# Patient Record
Sex: MALE | Race: WHITE
[De-identification: names, ages, dates, MRNs, and addresses within clinical notes are randomized per-mention and may not be internally consistent; named-entity substitution may affect disease eponyms.]

---

## 2017-12-01 ENCOUNTER — HOSPITAL ENCOUNTER (EMERGENCY)
Dept: HOSPITAL 10 - E/R | Age: 57
Discharge: LEFT BEFORE DISCHARGE | End: 2017-12-01
Payer: COMMERCIAL

## 2017-12-01 VITALS
RESPIRATION RATE: 17 BRPM | SYSTOLIC BLOOD PRESSURE: 114 MMHG | HEART RATE: 100 BPM | DIASTOLIC BLOOD PRESSURE: 82 MMHG | TEMPERATURE: 98.6 F

## 2017-12-01 VITALS
WEIGHT: 190.04 LBS | WEIGHT: 190.04 LBS | HEIGHT: 70 IN | HEIGHT: 70 IN | BODY MASS INDEX: 27.21 KG/M2 | BODY MASS INDEX: 27.21 KG/M2

## 2017-12-01 DIAGNOSIS — E11.9: ICD-10-CM

## 2017-12-01 DIAGNOSIS — I10: ICD-10-CM

## 2017-12-01 DIAGNOSIS — R10.32: ICD-10-CM

## 2017-12-01 DIAGNOSIS — R06.00: ICD-10-CM

## 2017-12-01 DIAGNOSIS — Z95.1: ICD-10-CM

## 2017-12-01 DIAGNOSIS — I25.10: ICD-10-CM

## 2017-12-01 DIAGNOSIS — R07.9: Primary | ICD-10-CM

## 2017-12-01 LAB
ANION GAP SERPL CALC-SCNC: 13 MMOL/L (ref 8–16)
BASOPHILS # BLD AUTO: 0 10^3/UL (ref 0–0.1)
BASOPHILS NFR BLD: 0.4 % (ref 0–2)
BNP SERPL-MCNC: 1220 PG/ML (ref 0–125)
BUN SERPL-MCNC: 12 MG/DL (ref 7–20)
CALCIUM SERPL-MCNC: 9.3 MG/DL (ref 8.4–10.2)
CHLORIDE SERPL-SCNC: 104 MMOL/L (ref 97–110)
CO2 SERPL-SCNC: 28 MMOL/L (ref 21–31)
CREAT SERPL-MCNC: 0.82 MG/DL (ref 0.61–1.24)
EOSINOPHIL # BLD: 0.3 10^3/UL (ref 0–0.5)
EOSINOPHIL NFR BLD: 2.5 % (ref 0–7)
ERYTHROCYTE [DISTWIDTH] IN BLOOD BY AUTOMATED COUNT: 13 % (ref 11.5–14.5)
GLUCOSE SERPL-MCNC: 92 MG/DL (ref 70–220)
HCT VFR BLD CALC: 29.8 % (ref 42–52)
HGB BLD-MCNC: 9.8 G/DL (ref 14–18)
INR PPP: 1.09
LYMPHOCYTES # BLD AUTO: 2.3 10^3/UL (ref 0.8–2.9)
LYMPHOCYTES NFR BLD AUTO: 22.7 % (ref 15–51)
MCH RBC QN AUTO: 31.1 PG (ref 29–33)
MCHC RBC AUTO-ENTMCNC: 32.9 G/DL (ref 32–37)
MCV RBC AUTO: 94.6 FL (ref 82–101)
MONOCYTES # BLD: 0.7 10^3/UL (ref 0.3–0.9)
MONOCYTES NFR BLD: 7.2 % (ref 0–11)
NEUTROPHILS # BLD: 6.7 10^3/UL (ref 1.6–7.5)
NEUTROPHILS NFR BLD AUTO: 66.5 % (ref 39–77)
NRBC # BLD MANUAL: 0 10^3/UL (ref 0–0)
NRBC BLD AUTO-RTO: 0 /100WBC (ref 0–0)
PLATELET # BLD: 543 10^3/UL (ref 140–415)
PMV BLD AUTO: 9.2 FL (ref 7.4–10.4)
POTASSIUM SERPL-SCNC: 4.2 MMOL/L (ref 3.5–5.1)
PROTHROMBIN TIME: 14.2 SEC (ref 11.9–14.9)
PT RATIO: 1.1
RBC # BLD AUTO: 3.15 10^6/UL (ref 4.7–6.1)
SODIUM SERPL-SCNC: 141 MMOL/L (ref 135–144)
TROPONIN I SERPL-MCNC: 0.08 NG/ML (ref 0–0.12)
WBC # BLD AUTO: 10.1 10^3/UL (ref 4.8–10.8)

## 2017-12-01 PROCEDURE — 36415 COLL VENOUS BLD VENIPUNCTURE: CPT

## 2017-12-01 PROCEDURE — 71010: CPT

## 2017-12-01 PROCEDURE — 84484 ASSAY OF TROPONIN QUANT: CPT

## 2017-12-01 PROCEDURE — 93971 EXTREMITY STUDY: CPT

## 2017-12-01 PROCEDURE — 83880 ASSAY OF NATRIURETIC PEPTIDE: CPT

## 2017-12-01 PROCEDURE — 93005 ELECTROCARDIOGRAM TRACING: CPT

## 2017-12-01 PROCEDURE — 85610 PROTHROMBIN TIME: CPT

## 2017-12-01 PROCEDURE — 80048 BASIC METABOLIC PNL TOTAL CA: CPT

## 2017-12-01 PROCEDURE — 85025 COMPLETE CBC W/AUTO DIFF WBC: CPT

## 2017-12-01 NOTE — RADRPT
PROCEDURE:   Chest x-ray 

 

CLINICAL INDICATION:   Chest pain

 

TECHNIQUE:   Chest single view

 

COMPARISON:   None 

 

FINDINGS:

There post sternotomy changes. Moderate cardiomegaly and mild atherosclerotic aortic calcification s
een.  The pulmonary vessels are normal in caliber.  The lungs are clear.  The costophrenic angles ar
e sharp.  The visualized bony thorax is unremarkable. 

 

IMPRESSION:

 

Cardiomegaly and mild atherosclerotic aortic calcification

Status post sternotomy

 

 

RPTAT: HH

_____________________________________________ 

.Fabrizio Chaparro MD, MD           Date    Time 

Electronically viewed and signed by .Fabrizio Chaparro MD, MD on 12/01/2017 16:44 

 

D:  12/01/2017 16:44  T:  12/01/2017 16:44

.W/

## 2017-12-01 NOTE — RADRPT
PROCEDURE:  US venous left lower extremity 

 

CLINICAL INDICATION:  Left lower extremity swelling. 

 

TECHNIQUE:  Multiple longitudinal and transverse images of the left lower extremity veins were obtai
moisés with gray scale and color Doppler imaging. The calf veins were interrogated as well. 

 

COMPARISON:  None available. 

 

FINDINGS:  

Common femoral vein: Normal flow. Compressible and augmentable.

Proximal superficial femoral vein: Normal flow. Compressible and augmentable.

Mid superficial femoral vein: Normal flow. Compressible and augmentable.

Distal superficial femoral vein: Normal flow. Compressible and augmentable.

Popliteal vein: Normal flow. Compressible and augmentable.

Calf veins: Normal flow.

 

IMPRESSION:

 

1.   No evidence of a deep vein thrombosis within the left lower extremity.  

 

RPTAT: HLBP

_____________________________________________ 

.Marko Chen MD, MD           Date    Time 

Electronically viewed and signed by .Marko Chen MD, MD on 12/01/2017 17:23 

 

D:  12/01/2017 17:23  T:  12/01/2017 17:23

.P/

## 2017-12-01 NOTE — ERD
ER Documentation


Chief Complaint


Chief Complaint


Pt presents with CP x 2 days and B leg pain, Pt had CAGB-4V 11/17/17





HPI


Patient is a 57-year-old male who presents with sudden onset intermittent 

supraclavicular pain when he tries to sit up for the last 2 weeks.  This began 

after he had surgery at [facility] for a four-vessel CABG.  He also 

had vein harvest from right leg and coronary cath access in the left groin.  He 

complains of pain in the left groin is worse with walking.  He denies fever.  

He does report constant mild shortness of breath that is worse with ambulation.

  He has not yet had follow-up with his cardiothoracic surgeon.





ROS


All systems reviewed and are negative except as per history of present illness.





Allergies


Allergies:  


Coded Allergies:  


     No Known Allergy (Unverified , 12/1/17)





PMhx/Soc


Past medical history: Coronary artery disease, diabetes mellitus, hypertension


Past surgical history: CABG, 2 hernia repairs


Social history: Remote history of cocaine and cannabis use, no current drug, 

alcohol or tobacco use.


History of Surgery:  Yes (CABGx4, right shoulder, bilateral inguinal hernia)


Anesthesia Reaction:  No


Hx Neurological Disorder:  No


Hx Respiratory Disorders:  No


Hx Cardiac Disorders:  Yes (MI)


Hx Psychiatric Problems:  No


Hx Miscellaneous Medical Probl:  No


Hx Alcohol Use:  No


Hx Substance Use:  No


Hx Tobacco Use:  No


Smoking Status:  Never smoker





FmHx


Noncontributory





Physical Exam


Vitals





Vital Signs








  Date Time  Temp Pulse Resp B/P Pulse Ox O2 Delivery O2 Flow Rate FiO2


 


12/1/17 17:31 98.6 100 17 114/82 97 Room Air  


 


12/1/17 16:07 98.6 105 17 123/78 97 Room Air  


 


12/1/17 15:02 98.4 107 18 144/69 96   








Physical Exam


Const:     Alert, no acute distress


Head:   Atraumatic 


Eyes:    Normal Conjunctiva, No pallor, no icterus


ENT:    Normal External Ears, Nose and Mouth.  Mucous membranes moist


Neck:               Full range of motion. No JVD


Resp:    Clear to auscultation bilaterally, No wheezes, no rales


Cardio:    Regular rate and rhythm, no murmurs, No gallop, no rub.  Midline 

sternotomy incision clean, dry and intact.


Abd:    Soft, non tender, non distended. 


Skin:    No petechiae or rashes


Ext:    No cyanosis, or edema, 2+ pulses in 4 extremities.  Tenderness in the 

left medial thigh, no pulsatile mass.


Neur:    Awake and alert, Cranial nerves II through XII intact bilaterally, 

strength and sensation full for extremity's.


Psych:    Normal Mood and Affect


Result Diagram:  


12/1/17 1700                                                                   

             12/1/17 1700





Results 24 hrs





 Laboratory Tests








Test


  12/1/17


17:00 12/1/17


18:05


 


White Blood Count 10.110^3/ul  


 


Red Blood Count 3.1510^6/ul  


 


Hemoglobin 9.8g/dl  


 


Hematocrit 29.8%  


 


Mean Corpuscular Volume 94.6fl  


 


Mean Corpuscular Hemoglobin 31.1pg  


 


Mean Corpuscular Hemoglobin


Concent 32.9g/dl 


  


 


 


Red Cell Distribution Width 13.0%  


 


Platelet Count 02434^3/UL  


 


Mean Platelet Volume 9.2fl  


 


Neutrophils % 66.5%  


 


Lymphocytes % 22.7%  


 


Monocytes % 7.2%  


 


Eosinophils % 2.5%  


 


Basophils % 0.4%  


 


Nucleated Red Blood Cells % 0.0/100WBC  


 


Neutrophils # 6.710^3/ul  


 


Lymphocytes # 2.310^3/ul  


 


Monocytes # 0.710^3/ul  


 


Eosinophils # 0.310^3/ul  


 


Basophils # 0.010^3/ul  


 


Nucleated Red Blood Cells # 0.010^3/ul  


 


Sodium Level 141mmol/L  


 


Potassium Level 4.2mmol/L  


 


Chloride Level 104mmol/L  


 


Carbon Dioxide Level 28mmol/L  


 


Anion Gap 13  


 


Blood Urea Nitrogen 12mg/dl  


 


Creatinine 0.82mg/dl  


 


Glucose Level 92mg/dl  


 


Calcium Level 9.3mg/dl  


 


Troponin I 0.078ng/ml  


 


B-Type Natriuretic Peptide 1220PG/ML  


 


Prothrombin Time  14.2Sec 


 


Prothrombin Time Ratio  1.1 


 


INR International Normalized


Ratio 


  1.09 


 








 Current Medications








 Medications


  (Trade)  Dose


 Ordered  Sig/Bria


 Route


 PRN Reason  Start Time


 Stop Time Status Last Admin


Dose Admin


 


 Aspirin


  (Aspirin)  162 mg  ONCE  STAT


 PO


   12/1/17 16:22


 12/1/17 16:25 DC 12/1/17 17:33


 











Procedures/MDM


EKG read by me: Time 1507, rate 113


Rhythm: Sinus tachycardia


Axis: Normal


Intervals: Normal


ST-T waves: Lateral T-wave inversion, inferior T-wave inversion, no ST 

elevation or depression


Ectopy: No


Q-waves: Inferior Q waves are not clearly pathologic


Impression:     Sinus tachycardia with T-wave inversions.





MDM: Patient is a 57-year-old male who had a four-vessel CABG approximately 2 

weeks ago and presents to the ER with complaint of dyspnea that is exacerbated 

by exertion, pain in his upper sternum, and pain in his left groin.  With 

regards to the pain in his chest, he states that it is with movement such as 

leaning forward or sitting up.  He denies that he has chest pain with exertion.

  It is focal in the region of the sternotomy, and is likely chest wall pain.  

However, the patient does report dyspnea at rest that is worse with exertion.  

He has an elevated BNP, and slightly elevated troponin.  I advised that he be 

admitted to the hospital for echocardiogram to exclude underlying CHF or 

pericardial effusion, or complication of recent CABG.  I also evaluated the 

patient's groin pain.  There is no clinical sign of a post-cath aneurysm, and 

ultrasound is negative for DVT.  I advised the patient that he may require an 

arterial ultrasound to evaluate further.  He had equal pulses in 4 extremities.

  There is no sign of infection.  The patient refused admission to the hospital 

and signed out AGAINST MEDICAL ADVICE.  I discussed with him risks including 

surgery related complications, heart attack, heart failure, arrhythmia, aneurysm

, infection or death.  The patient was able to express understanding of these 

risks and signed out AGAINST MEDICAL ADVICE.  I did speak to the patient's 

cardiothoracic surgeon, Dr. Beth at [facility].  He could not 

provide a specified follow-up appointments, but advised that the patient call 

his office on Monday to arrange a follow-up appointment.  The patient was 

advised of the need to do so.





Departure


Diagnosis:  


 Primary Impression:  


 Chest pain


 Chest pain type:  unspecified  Qualified Code:  R07.9 - Chest pain, 

unspecified type


 Additional Impressions:  


 Groin pain


 Laterality:  left  Qualified Code:  R10.32 - Left inguinal pain


 Dyspnea


 Dyspnea type:  unspecified  Qualified Code:  R06.00 - Dyspnea, unspecified type


Condition:  Stable (AGAINST MEDICAL ADVICE)











DAVIN ORELLANA MD Dec 1, 2017 16:31

## 2017-12-02 ENCOUNTER — HOSPITAL ENCOUNTER (INPATIENT)
Dept: HOSPITAL 10 - E/R | Age: 57
LOS: 3 days | Discharge: HOME | DRG: 314 | End: 2017-12-05
Payer: COMMERCIAL

## 2017-12-02 VITALS
WEIGHT: 193.35 LBS | WEIGHT: 193.35 LBS | BODY MASS INDEX: 27.07 KG/M2 | BODY MASS INDEX: 27.07 KG/M2 | HEIGHT: 71 IN | HEIGHT: 71 IN

## 2017-12-02 DIAGNOSIS — Z79.4: ICD-10-CM

## 2017-12-02 DIAGNOSIS — J90: ICD-10-CM

## 2017-12-02 DIAGNOSIS — Z95.1: ICD-10-CM

## 2017-12-02 DIAGNOSIS — Z79.82: ICD-10-CM

## 2017-12-02 DIAGNOSIS — I50.33: ICD-10-CM
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
## 2017-12-02 DIAGNOSIS — E11.9: ICD-10-CM

## 2017-12-02 DIAGNOSIS — I31.3: Primary | ICD-10-CM

## 2017-12-02 DIAGNOSIS — E78.5: ICD-10-CM

## 2017-12-02 DIAGNOSIS — I25.810: ICD-10-CM

## 2017-12-02 DIAGNOSIS — I11.0: ICD-10-CM

## 2017-12-02 DIAGNOSIS — I97.641: ICD-10-CM

## 2017-12-02 LAB
ALBUMIN SERPL-MCNC: 3.9 G/DL (ref 3.3–4.9)
ALBUMIN/GLOB SERPL: 1.21 {RATIO}
ALP SERPL-CCNC: 105 IU/L (ref 42–121)
ALT SERPL-CCNC: 38 IU/L (ref 13–69)
ANION GAP SERPL CALC-SCNC: 15 MMOL/L (ref 8–16)
ARTERIAL COHB: 0.3 % (ref 0–3)
ARTERIAL FRACTION OF OXYHGB: 94.1 % (ref 93–99)
ARTERIAL METHB: 0.1 % (ref 0–1.5)
ARTERIAL PATENCY WRIST A: (no result)
ARTERIAL TOTAL HEMGLOBIN: 11.2 G/DL (ref 12–18)
AST SERPL-CCNC: 25 IU/L (ref 15–46)
BASE EXCESS BLDA CALC-SCNC: 0.7 MMOL/L (ref -3–3)
BASOPHILS # BLD AUTO: 0.1 10^3/UL (ref 0–0.1)
BASOPHILS NFR BLD: 0.6 % (ref 0–2)
BILIRUB DIRECT SERPL-MCNC: 0 MG/DL (ref 0–0.2)
BILIRUB SERPL-MCNC: 0.3 MG/DL (ref 0.2–1.3)
BNP SERPL-MCNC: 1170 PG/ML (ref 0–125)
BUN SERPL-MCNC: 15 MG/DL (ref 7–20)
CALCIUM SERPL-MCNC: 9.1 MG/DL (ref 8.4–10.2)
CHLORIDE SERPL-SCNC: 102 MMOL/L (ref 97–110)
CO2 SERPL-SCNC: 28 MMOL/L (ref 21–31)
CREAT SERPL-MCNC: 0.85 MG/DL (ref 0.61–1.24)
EOSINOPHIL # BLD: 0.4 10^3/UL (ref 0–0.5)
EOSINOPHIL NFR BLD: 2.9 % (ref 0–7)
ERYTHROCYTE [DISTWIDTH] IN BLOOD BY AUTOMATED COUNT: 13 % (ref 11.5–14.5)
GLOBULIN SER-MCNC: 3.2 G/DL (ref 1.3–3.2)
GLUCOSE SERPL-MCNC: 109 MG/DL (ref 70–220)
HCO3 BLDA-SCNC: 24.9 MMOL/L (ref 22–26)
HCT VFR BLD CALC: 32.2 % (ref 42–52)
HGB BLD-MCNC: 10.5 G/DL (ref 14–18)
INR PPP: 1.12
LYMPHOCYTES # BLD AUTO: 2.3 10^3/UL (ref 0.8–2.9)
LYMPHOCYTES NFR BLD AUTO: 18.6 % (ref 15–51)
MCH RBC QN AUTO: 30.6 PG (ref 29–33)
MCHC RBC AUTO-ENTMCNC: 32.6 G/DL (ref 32–37)
MCV RBC AUTO: 93.9 FL (ref 82–101)
MODE: (no result)
MONOCYTES # BLD: 1 10^3/UL (ref 0.3–0.9)
MONOCYTES NFR BLD: 7.7 % (ref 0–11)
NEUTROPHILS # BLD: 8.5 10^3/UL (ref 1.6–7.5)
NEUTROPHILS NFR BLD AUTO: 69.4 % (ref 39–77)
NRBC # BLD MANUAL: 0 10^3/UL (ref 0–0)
NRBC BLD AUTO-RTO: 0 /100WBC (ref 0–0)
O2 A-A PPRESDIFF RESPIRATORY: 29.3 MMHG (ref 7–24)
O2/TOTAL GAS SETTING VFR VENT: 21 %
PLATELET # BLD: 553 10^3/UL (ref 140–415)
PMV BLD AUTO: 9.1 FL (ref 7.4–10.4)
POTASSIUM SERPL-SCNC: 3.6 MMOL/L (ref 3.5–5.1)
PROT SERPL-MCNC: 7.1 G/DL (ref 6.1–8.1)
PROTHROMBIN TIME: 14.6 SEC (ref 11.9–14.9)
PT RATIO: 1.1
RBC # BLD AUTO: 3.43 10^6/UL (ref 4.7–6.1)
SODIUM SERPL-SCNC: 141 MMOL/L (ref 135–144)
TROPONIN I SERPL-MCNC: 0.06 NG/ML (ref 0–0.12)
WBC # BLD AUTO: 12.3 10^3/UL (ref 4.8–10.8)

## 2017-12-02 PROCEDURE — 83036 HEMOGLOBIN GLYCOSYLATED A1C: CPT

## 2017-12-02 PROCEDURE — 87279 PARAINFLUENZA AG IF: CPT

## 2017-12-02 PROCEDURE — 85025 COMPLETE CBC W/AUTO DIFF WBC: CPT

## 2017-12-02 PROCEDURE — 94640 AIRWAY INHALATION TREATMENT: CPT

## 2017-12-02 PROCEDURE — 85610 PROTHROMBIN TIME: CPT

## 2017-12-02 PROCEDURE — 84484 ASSAY OF TROPONIN QUANT: CPT

## 2017-12-02 PROCEDURE — 83690 ASSAY OF LIPASE: CPT

## 2017-12-02 PROCEDURE — 82550 ASSAY OF CK (CPK): CPT

## 2017-12-02 PROCEDURE — 71010: CPT

## 2017-12-02 PROCEDURE — 80053 COMPREHEN METABOLIC PANEL: CPT

## 2017-12-02 PROCEDURE — 94664 DEMO&/EVAL PT USE INHALER: CPT

## 2017-12-02 PROCEDURE — 36600 WITHDRAWAL OF ARTERIAL BLOOD: CPT

## 2017-12-02 PROCEDURE — 71275 CT ANGIOGRAPHY CHEST: CPT

## 2017-12-02 PROCEDURE — 83735 ASSAY OF MAGNESIUM: CPT

## 2017-12-02 PROCEDURE — 87275 INFLUENZA B AG IF: CPT

## 2017-12-02 PROCEDURE — 93306 TTE W/DOPPLER COMPLETE: CPT

## 2017-12-02 PROCEDURE — 80061 LIPID PANEL: CPT

## 2017-12-02 PROCEDURE — 82962 GLUCOSE BLOOD TEST: CPT

## 2017-12-02 PROCEDURE — 36415 COLL VENOUS BLD VENIPUNCTURE: CPT

## 2017-12-02 PROCEDURE — 96374 THER/PROPH/DIAG INJ IV PUSH: CPT

## 2017-12-02 PROCEDURE — 82803 BLOOD GASES ANY COMBINATION: CPT

## 2017-12-02 PROCEDURE — 82553 CREATINE MB FRACTION: CPT

## 2017-12-02 PROCEDURE — 87280 RESPIRATORY SYNCYTIAL AG IF: CPT

## 2017-12-02 PROCEDURE — 84443 ASSAY THYROID STIM HORMONE: CPT

## 2017-12-02 PROCEDURE — 87276 INFLUENZA A AG IF: CPT

## 2017-12-02 PROCEDURE — 87400 INFLUENZA A/B EACH AG IA: CPT

## 2017-12-02 PROCEDURE — 83880 ASSAY OF NATRIURETIC PEPTIDE: CPT

## 2017-12-02 NOTE — ERD
ER Documentation


Chief Complaint


Chief Complaint


left arm numbness/sob x 1 hour. also c/o chest pain





HPI


57-year-old man status post CABG about 10 days ago presents with shortness of 

breath, he was seen and evaluated yesterday for similar symptoms.  He lives in 

his car and does not ambulate much, he denies fevers or chills, no history of 

COPD or smoking, no complaints of chest pain.  He has had no vomiting or 

diarrhea, no complaints of abdominal pain.  Patient denies calf or leg 

swelling.  Patient was seen and evaluated here yesterday and workup was 

unremarkable, bilateral lower extremity ultrasound was negative for DVT.





ROS


All systems reviewed and are negative except as per history of present illness.





Allergies


Allergies:  


Coded Allergies:  


     No Known Allergy (Unverified , 12/3/17)





PMhx/Soc


CAD status post CABG with right lower extremity vein harvest, hypertension, 

hernia


History of Surgery:  Yes (CABGx4, right shoulder, bilateral inguinal hernia)


Anesthesia Reaction:  No


Hx Neurological Disorder:  No


Hx Respiratory Disorders:  No


Hx Cardiac Disorders:  Yes (MI)


Hx Psychiatric Problems:  No


Hx Miscellaneous Medical Probl:  No


Hx Alcohol Use:  No


Hx Substance Use:  No


Hx Tobacco Use:  No





FmHx


Family History:  No diabetes





Physical Exam


Vitals





Vital Signs








  Date Time  Temp Pulse Resp B/P Pulse Ox O2 Delivery O2 Flow Rate FiO2


 


12/3/17 02:27  98 20 117/78 98 Room Air  


 


12/2/17 22:02 98.2 110 20 114/75 99   








Physical Exam


GENERAL: Well-developed, well-nourished, well-hydrated, in no apparent distress

, looks nontoxic in appearance


HEENT: Moist mucous membranes, pink conjunctiva, no cervical spine tenderness 

or step-off deformities, no goiter, no jaundice or icterus, extraocular 

movements intact without pain. No submandibular induration, and no pharyngeal 

erythema


NEURO: Alert and oriented 3, cranial nerves II through XII intact bilaterally, 

pupils equal round reactive to light, no focal deficits or facial asymmetry, 

sensation intact distally Strength 5/5 in upper and lower extremities 

bilaterally


CARDIAC: Tachycardic and regular, no murmurs rubs or gallops


LUNGS: Clear bilaterally no wheezing crackles or stridor


ABDOMEN: Soft nontender, no guarding, no rigidity, no rebound, no psoas sign no 

obturator sign. Normoactive bowel sounds


SKIN: Warm and dry to touch, no abrasions, contusions, or hematomas, no 

lacerations, no ecchymosis, no target lesions, and without ulcers


EXTREMITIES: No clubbing cyanosis or edema, calves are bilaterally symmetrical, 

no Homans sign, no popliteal cord sign. Distal pulses equal and bilateral


PSYCH: Normal affect without agitation or irritability


Result Diagram:  


12/2/17 2231 12/2/17 2231





Results 24 hrs





 Laboratory Tests








Test


  12/2/17


22:17 12/2/17


22:31


 


Blood Gas Specimen Source Blood arterial  


 


Arterial Blood Date Drawn


  12/2/2017


10:30:33 PM 


 


 


Arterial Blood pH (Temp


corrected) 7.430 


  


 


 


Arterial Blood pCO2 (Temp


correct) 38.4mmhg 


  


 


 


Arterial Blood pO2 (Temp


corrected) 74.4mmHG 


  


 


 


Arterial Blood HCO3 24.9mmol/L  


 


Arterial Blood Base Excess 0.7mmol/L  


 


Arterial Blood Oxygen


Saturation 94.5mmHG 


  


 


 


Teo Test ACCEPTAB  


 


Arterial Blood Gas Puncture


Site Right Radial 


  


 


 


Arterial Blood


Carboxyhemoglobin 0.3% 


  


 


 


Arterial Blood Methemoglobin 0.1%  


 


Blood Gas A-a O2 Differential 29.3mmHg  


 


Oxyhemoglobin Percent 94.1%  


 


Total Hemoglobin 11.2g/dl  


 


Blood Gas Temperature 37.0C  


 


Blood Gas Actual Respiration


Rate 18 


  


 


 


Blood Gas Modality ROOM AIR  


 


FiO2 21.0%  


 


Blood Gas Notified Whom   


 


Blood Gas Notified Time


  12/2/2017


10:37:19 PM 


 


 


White Blood Count  12.310^3/ul 


 


Red Blood Count  3.4310^6/ul 


 


Hemoglobin  10.5g/dl 


 


Hematocrit  32.2% 


 


Mean Corpuscular Volume  93.9fl 


 


Mean Corpuscular Hemoglobin  30.6pg 


 


Mean Corpuscular Hemoglobin


Concent 


  32.6g/dl 


 


 


Red Cell Distribution Width  13.0% 


 


Platelet Count  50458^3/UL 


 


Mean Platelet Volume  9.1fl 


 


Neutrophils %  69.4% 


 


Lymphocytes %  18.6% 


 


Monocytes %  7.7% 


 


Eosinophils %  2.9% 


 


Basophils %  0.6% 


 


Nucleated Red Blood Cells %  0.0/100WBC 


 


Neutrophils #  8.510^3/ul 


 


Lymphocytes #  2.310^3/ul 


 


Monocytes #  1.010^3/ul 


 


Eosinophils #  0.410^3/ul 


 


Basophils #  0.110^3/ul 


 


Nucleated Red Blood Cells #  0.010^3/ul 


 


Prothrombin Time  14.6Sec 


 


Prothrombin Time Ratio  1.1 


 


INR International Normalized


Ratio 


  1.12 


 


 


Sodium Level  141mmol/L 


 


Potassium Level  3.6mmol/L 


 


Chloride Level  102mmol/L 


 


Carbon Dioxide Level  28mmol/L 


 


Anion Gap  15 


 


Blood Urea Nitrogen  15mg/dl 


 


Creatinine  0.85mg/dl 


 


Glucose Level  109mg/dl 


 


Calcium Level  9.1mg/dl 


 


Total Bilirubin  0.3mg/dl 


 


Direct Bilirubin  0.00mg/dl 


 


Indirect Bilirubin  0.3mg/dl 


 


Aspartate Amino Transf


(AST/SGOT) 


  25IU/L 


 


 


Alanine Aminotransferase


(ALT/SGPT) 


  38IU/L 


 


 


Alkaline Phosphatase  105IU/L 


 


Troponin I  0.062ng/ml 


 


B-Type Natriuretic Peptide  1170PG/ML 


 


Total Protein  7.1g/dl 


 


Albumin  3.9g/dl 


 


Globulin  3.20g/dl 


 


Albumin/Globulin Ratio  1.21 


 


Lipase  51U/L 








 Current Medications








 Medications


  (Trade)  Dose


 Ordered  Sig/Bria


 Route


 PRN Reason  Start Time


 Stop Time Status Last Admin


Dose Admin


 


 IV Flush 10 ml  10 ml  STK-MED ONCE


 .ROUTE


   12/3/17 00:44


 12/3/17 00:45 DC 12/3/17 00:51


 


 


 Sodium Chloride  100 ml @ ud  STK-MED ONCE


 .ROUTE


   12/3/17 00:44


 12/3/17 00:45 DC 12/3/17 00:51


 


 


 Iohexol


  (Omnipaque)  100 ml @ ud  STK-MED ONCE


 .ROUTE


   12/3/17 00:44


 12/3/17 00:45 DC 12/3/17 00:51


 


 


 Morphine Sulfate


  (morphine)  2 mg  ONCE  STAT


 IV


   12/3/17 02:23


 12/3/17 02:42 DC 12/3/17 03:17


 











Procedures/[facility]


IV line was established patient was placed on cardiac monitor rhythm strip 

revealed a sinus tachycardia at 110 bpm with upright P and T waves.  Patient 

was afebrile





EKG performed, read by me revealed a sinus tachycardia at 105 bpm normal axis, 

narrow QRS complex, no concerning ST elevations or depressions noted.





CBC and electrolytes were unremarkable, liver function tests were normal, 

troponin was negative, ABG was normal.  BNP was elevated for the second day in 

a row.





One view chest x-ray performed, read by me revealed sternotomy wires, no acute 

infiltrates, no pneumothorax.





Given the patient's continued tachycardia CT angiogram of the chest was ordered 

IMPRESSION:


No evidence of pulmonary embolism or aortic dissection. 


 


Status post mediastinotomy and CABG with a moderate-to-large retrosternal fluid 

collection measuring 3.9 x 7.5 x 16.1 cm. Findings could represent a 

postoperative seroma, hematoma or abscess.


 


Small to moderate pericardial effusion.


 


Bilateral small pleural effusions with underlying atelectasis, greater on the 

left.


 


Cholelithiasis.


 


Coronary artery disease.





Departure


Diagnosis:  


 Primary Impression:  


 Shortness of breath


 Additional Impressions:  


 Pericardial effusion


 Pleural effusion


Condition:  Fair











SHRADDHA JORDAN MD Dec 2, 2017 22:21

## 2017-12-03 VITALS — DIASTOLIC BLOOD PRESSURE: 77 MMHG | SYSTOLIC BLOOD PRESSURE: 131 MMHG | RESPIRATION RATE: 16 BRPM

## 2017-12-03 VITALS — RESPIRATION RATE: 20 BRPM | DIASTOLIC BLOOD PRESSURE: 68 MMHG | SYSTOLIC BLOOD PRESSURE: 122 MMHG

## 2017-12-03 VITALS — HEART RATE: 101 BPM

## 2017-12-03 VITALS — TEMPERATURE: 98.6 F

## 2017-12-03 VITALS — DIASTOLIC BLOOD PRESSURE: 62 MMHG | SYSTOLIC BLOOD PRESSURE: 106 MMHG | RESPIRATION RATE: 17 BRPM

## 2017-12-03 VITALS — SYSTOLIC BLOOD PRESSURE: 120 MMHG | DIASTOLIC BLOOD PRESSURE: 70 MMHG | RESPIRATION RATE: 20 BRPM

## 2017-12-03 VITALS — SYSTOLIC BLOOD PRESSURE: 139 MMHG | RESPIRATION RATE: 17 BRPM | DIASTOLIC BLOOD PRESSURE: 83 MMHG

## 2017-12-03 VITALS — HEART RATE: 98 BPM

## 2017-12-03 VITALS — SYSTOLIC BLOOD PRESSURE: 136 MMHG | DIASTOLIC BLOOD PRESSURE: 90 MMHG | RESPIRATION RATE: 18 BRPM

## 2017-12-03 VITALS — HEART RATE: 105 BPM

## 2017-12-03 VITALS — HEART RATE: 90 BPM

## 2017-12-03 LAB
CHOLEST SERPL-MCNC: 87 MG/DL (ref 100–200)
CHOLEST/HDLC SERPL: 2.7 RATIO
CK MB SERPL-MCNC: 0.59 NG/ML (ref 0–2.4)
CK MB SERPL-MCNC: 1.18 NG/ML (ref 0–2.4)
CK SERPL-CCNC: 62 IU/L (ref 23–200)
CK SERPL-CCNC: 76 IU/L (ref 23–200)
HDLC SERPL-MCNC: 32 MG/DL (ref 30–78)
MAGNESIUM SERPL-MCNC: 1.1 MG/DL (ref 1.7–2.5)
TRIGL SERPL-MCNC: 125 MG/DL (ref 0–149)
TROPONIN I SERPL-MCNC: 0.04 NG/ML (ref 0–0.12)
TROPONIN I SERPL-MCNC: 0.05 NG/ML (ref 0–0.12)
TSH SERPL-ACNC: 3.18 MIU/L (ref 0.47–4.68)

## 2017-12-03 RX ADMIN — ATORVASTATIN CALCIUM SCH MG: 80 TABLET, FILM COATED ORAL at 21:28

## 2017-12-03 RX ADMIN — IPRATROPIUM BROMIDE AND ALBUTEROL SULFATE SCH ML: .5; 3 SOLUTION RESPIRATORY (INHALATION) at 19:24

## 2017-12-03 NOTE — RADRPT
Echocardiogram Report

 

Patient Name:  EDYTA RINALDI   Gender:       Male

MRN:           [id number]            Accession #:  [id number]

Birth Date:    1960        Study Date:   03-Dec-2017

Sonographer:   QUINN                Location:     [id number]

 

Ref. Physician: DARRIUS COLES

Quality: Technically Difficult Study

 

Procedures: Transthoracic echocardiogram with complete 2D, M-Mode, and 

doppler examination.

Indications: S/P CABG. Shortness of breath.

 

2D/M Mode                          Doppler

Measurement  Value  Normal Ranges  Measurement    Value  Normal Ranges

AoR Diam MM  3.6    cm             SYBIL Vmax       3.3    cm2

LA/Ao MM     1.1                   AV Mean Kamran    0.8    m/sec

LA Dimen MM  4.1    cm             AV Mean PG     3.0    mmHg

LVIDd 2D     4.8    3.5 - 5.6 cm   AV Peak Kamran    1.1    m/sec

LVIDs 2D     3.7    2.1 - 4.1 cm   AV Peak PG     5.0    mmHg

FS 2D        22.4   %              AV VTI         16.1   cm

LVPWd 2D     1.4    0.6 - 1.1 cm   LVOT Peak Kamran  1.0    m/sec

IVSd 2D      1.4    0.6 - 1.1 cm   LVOT Peak PG   4.0    mmHg

IVS/LVPW 2D  1.0                   MV E Peak Kamran  0.5    m/sec

EF 2D        50.0   50.0 - 65.0 %  MV A Peak Kamran  0.7    m/sec

LVOT Diam    2.1    cm             MV E/A         0.8

LVOT Area    3.5    cm2            MV Decel Time  148    msec

MV E/A         0.8

 

Findings

Left Ventricle: Lower limits of normal systolic function.  Normal left 

ventricular cavity size.  Mild-Moderate concentric left ventricular 

hypertrophy.  Paradoxical septal motion consistent with post 

pericardectomy status.  Ejection fraction based on Cleveland`s method is 

visually estimated at 50 %.  Tissue Doppler/Mitral Doppler indices are 

consistent with impaired relaxation (Stage I diastolic dysfunction).

Right Ventricle: Normal right ventricular size.  Normal right 

ventricular systolic function.

Left Atrium: There is mild enlargement of left atrium.

Right Atrium: The right atrium is normal in size.

Mitral Valve: Normal appearance and function of the mitral valve with 

trace physiologic regurgitation.

Aortic Valve: Normal appearance of the aortic valve.  No significant 

aortic stenosis or insufficiency.

Tricuspid Valve: Normal appearance and function of the tricuspid valve 

with trace physiologic regurgitation.  Unable to obtain RVSP due to 

minimal presence of tricuspid regurgitation.

Pulmonic Valve: Normal pulmonic valve appearance.

Pericardium: Small-Moderate pericardial effusion noted based on CTA with 

contrast done on 12/02/17.  Small bilateral pleural effusion noted based 

on CTA with contrast done on 12/02/17.

Aorta: Normal aortic root.

IVC: Normal size and normal respiratory collapse visually consistent 

with normal right atrial pressure.

 

Conclusions

1.Lower limits of normal systolic function.  Normal left ventricular 

cavity size.  Mild-Moderate concentric left ventricular hypertrophy.  

Paradoxical septal motion consistent with post pericardectomy status.  

Ejection fraction based on Cleveland`s method is visually estimated at 50 

%.  Tissue Doppler/Mitral Doppler indices are consistent with impaired 

relaxation (Stage I diastolic dysfunction).

2.There is mild enlargement of left atrium.

3.Normal appearance and function of the mitral valve with trace 

physiologic regurgitation.

4.Normal appearance of the aortic valve.  No significant aortic 

stenosis or insufficiency.

5.Normal appearance and function of the tricuspid valve with trace 

physiologic regurgitation.  Unable to obtain RVSP due to minimal 

presence of tricuspid regurgitation.

6.Small-Moderate pericardial effusion noted mostly around LV.   Small 

bilateral pleural effusion.

7.IVC has Normal size and normal respiratory collapse visually 

consistent with normal right atrial pressure.

 

Electronically Signed By:

Gumaro Leahy

03-Dec-2017 12:50:52  -0800

 

Patient Name: EDYTA RINALDI

MRN: [id number]

Study Date: 03-Dec-2017

 

[id number]

## 2017-12-03 NOTE — HP
Date/Time of Note


Date/Time of Note


DATE: 12/3/17 


TIME: 08:51





Assessment/Plan


VTE Prophylaxis


VTE Prophylaxis Intervention:  SCD's





Lines/Catheters


IV Catheter Type (from [facility]):  Saline Lock





Assessment/Plan


Chief Complaint/Hosp Course


This is a 57-year-old male being admitted to the telemetry floor for:





#1 shortness of breath: This likely could be multifactorial secondary to 

underlying pericardial effusion, mild CHF exacerbation, and possible underlying 

bronchitis.  At the current time he is afebrile.  He does have a mildly 

elevated white blood cell count of 12,000.  At the current time we will give 

him a dose of Lasix 40 mg IV.  Will check an echocardiogram.  Will monitor for 

any signs of any fevers.  CTA of the chest did not show any PE, but it did 

state that there was a large pericardial effusion and pleural effusions.  I do 

not think he needs any antibiotics at this time however will continue to 

monitor and initiate if indicated.  Will attempt to contact his surgeon who 

performed his CABG to see if the CT surgeon can see him at our hospital.  Will 

also may need to consult cardiology as well.  BNP was 1170, first set of 

troponins was negative, will repeat troponin.





#2  Retrosternal fluid collection: CT shows: Status post mediastinotomy and 

CABG with a moderate-to-large retrosternal fluid collection measuring 3.9 x 7.5 

x 16.1 cm. Findings could represent a postoperative seroma, hematoma or 

abscess.  Current time patient is afebrile.  He does have a mildly elevated 

white blood cell count.  Will need to get in touch with the patient's CT 

surgeon regarding this.  I did ask the ED physician if they could discuss with 

CT surgery whether this patient would be an appropriate admission at our 

hospital or would need to be transferred to the facility where he recently had 

surgery, however the ED physician stated that this could be dealt with as an 

outpatient and did not need to be discussed with CT surgery overnight.  

Nonetheless we will try to contact CT surgery today preferably his own CT 

surgeon to see if they may have privileges here otherwise patient likely will 

need to be transferred to his CT surgeon was facility.  If we are not unable to 

get in touch with the patient CT surgeon, we will contact CT surgeon evaluate 

respiratory and and ask for their recommendation. 





#3 Elevated BNP: will check an echo, consult ct surgery and possibly cardio in 

the am.





#4 hypertension: Continue to monitor and continue patient's home medications 

once they are verified.





#5 diabetes mellitus, check hemoglobin A1c, insulin sliding scale





#6 coronary artery disease: Continue patient's home medications once are 

verified, patient recently had an an MI status post CABG.





#7 DVT GI prophylaxis: SCDs, no GI prophylaxis indicated





Further treatment strategy will be implemented as per the clinical course





We will need to verify patient's home medications so that we can reconcile them.


Problems:  





HPI/ROS


Admit Date/Time


Admit Date/Time


Dec 2, 2017 at 23:56





Hx of Present Illness


Chief complaint: Shortness of breath





This is a 57-year-old man status post CABG about 10 days ago presents with 

shortness of breath, he was seen and evaluated yesterday for similar symptoms.  

He lives in his car and does not ambulate much, he denies fevers  but did 

report some chills chills, no history of COPD or smoking, no complaints of 

chest pain.  He has had no vomiting or diarrhea, no complaints of abdominal 

pain.  Patient denies calf or leg swelling.  Patient was seen and evaluated 

here yesterday and workup was unremarkable, bilateral lower extremity 

ultrasound was negative for DVT.  He does report that he has been coughing for 

approximately 5 days.  He states that his wife has been also having similar 

symptoms.  He also reports sputum which is clear.  Denies any fevers.  Though 

he does state he did notice some chills.





Allergies: NKDA





Medications: See MAR





ROS





Const: As per HPI


Eyes : No pain discharge or redness or change in visual acuity


ENT: No pain, sore throat, congestion, congestion,  dysphagia or discharge


Respiratory: As per HPI


Cardiovascular: As her HPI


GI : no change in appetite, abdominal pain, nausea, vomiting, diarrhea, 

constipation, or change in the color his stool 


Genitourinary: No dysuria, hematuria, flank pain ,  discharge or CVA tenderness


Musculoskeletal: No joint pain, back pain, neck pain, restricted range of 

motion in neck or joints


Skin: No rash, bruising or hives 


Neuro: No headache, dizziness, syncope, seizure, focal weakness


Endocrine: No polyuria, polydipsia, temperature intolerance


Psych: No hallucination, depression, anxiety or suicidal ideation





PMH/Family/Social


Past Medical History


MI, hypertension, diabetes mellitus, CAD





Past Surgical History


Recent CABG approximately 10 days ago, right rotator cuff surgery, bilateral 

inguinal hernia repair





Family History


Significant Family History:  heart disease, diabetes





Social History


Alcohol Use:  none


Smoking Status:  Never smoker


Drug Use:  none





Exam/Review of Systems


Vital Signs


Vitals





 Vital Signs








  Date Time  Temp Pulse Resp B/P Pulse Ox O2 Delivery O2 Flow Rate FiO2


 


12/3/17 08:16  98      


 


12/3/17 07:57 98.0  17 106/62 96   


 


12/3/17 03:36      Room Air  











Exam


Exam





General: She is lying in bed in no acute distress, he is cracking jokes


HEENT: Atraumatic, normocephalic. The pupils are equal, round and reactive. 

Extraocular motor are intact


Neck: Supple with full range of motion. No rigidity or meningismus


Chest: Tender to palpation across the anterior chest wall


Lungs: Clear to auscultation bilaterally, no crackles no rales or wheezing, 

cough with clear sputum


Heart: Normal S1-S2, Regular rhythm and rate.  No overt murmur appreciated


Abdomen: Soft , nontender,  nondistended , bowel sounds are present. No 

guarding no rebound tenderness , No masses or organomegaly. No costovertebral 

temporal angle mass


Extremities: Normal to inspection, no edema no cyanosis


Neurologic: Normal mental status, speech normal, cranial nerves II through XII 

are intact, motor and sensory are intact, no focal weakness


Additional Comments


PROCEDURE:    CT angiogram of the chest with contrast. 


 


CLINICAL INDICATION:    Shortness of breath. 


 


TECHNIQUE:    CT angiogram of the chest was obtained using a multi-detector high

-resolution CT.  Contiguous axial images were obtained during the dynamic 

injection of 80 cc of Omnipaque 350 intravenous contrast.  Coronal and sagittal 

reformatted images were obtained.  3-D reformatted images were also obtained.  

Images were reviewed on a PACS workstation. DICOM images are available.


 


One or more of the following dose reduction techniques were used:


- Automated exposure control.


- Adjustment of the mA and/or kV according to patient size.


- Use of iterative reconstruction technique.


 


Exam CTD/vol = 17.90 mGy.


Total exam DLP = 720.80 mGy-cm.   


 


COMPARISON:    None. 


 


FINDINGS:


The main pulmonary artery followed to the segmental divisions are well 

opacified.  There is no filling defect or evidence of pulmonary embolism.  The 

heart is normal in size with coronary artery calcifications.  There is a small 

to moderate pericardial effusion.  The aorta is of normal course and caliber 

without evidence of aneurysm or dissection.


 


The patient is status post mediastinotomy.  There is a retrosternal fluid 

collection measuring 3.9 cm AP by 7.5 cm transverse by 16.1 cm sagittal. The 

visualized thyroid is unremarkable.  There are no enlarged axillary lymph 

nodes.  There are no enlarged mediastinal or hilar lymph nodes by CT criteria.  

An azygos lobe is present. There are bilateral small pleural effusion with 

underlying atelectasis, greater on the left.  There is bilateral mild 

peribronchial thickening.


 


Limited evaluation of the upper abdomen demonstrates multiple gallstones. 


 


IMPRESSION:


No evidence of pulmonary embolism or aortic dissection. 


 


Status post mediastinotomy and CABG with a moderate-to-large retrosternal fluid 

collection measuring 3.9 x 7.5 x 16.1 cm. Findings could represent a 

postoperative seroma, hematoma or abscess.


 


Small to moderate pericardial effusion.


 


Bilateral small pleural effusions with underlying atelectasis, greater on the 

left.


 


Cholelithiasis.


 


Coronary artery disease.


_____________________________________________ 


.Chidi Girard MD, MD           Date    Time 


Electronically viewed and signed by .Chidi Girard MD, MD on 12/03/2017 03:09 


 


D:  12/03/2017 03:09  T:  12/03/2017 03:09


.T/





CC: SHRADDHA JORDAN MD


PROCEDURE:   CHEST - 1 VIEW  


 


CLINICAL INDICATION:   57-year-old male with chest/abdominal pain. 


 


TECHNIQUE:   A single frontal AP semi-erect view of the chest was performed.  

The images were reviewed on a PACS workstation. 


 


COMPARISON:   None.  


 


FINDINGS:


 


The patient has had a prior median sternotomy. The cardiomediastinal silhouette 

is within normal limits. The thoracic aortic arch is mildly calcified. There is 

a shallow inspiration. There is minimal bibasilar subsegmental atelectasis. 

There is no evidence for an infiltrate.  There is no evidence for congestive 

heart failure. There is no evidence for pneumothorax. There are small metallic 

densities within the inferior right glenoid from prior shoulder surgery. There 

are old fracture deformities involving the posterior left fourth, fifth, sixth 

and seventh ribs. Bilateral carotid bifurcation region calcifications are noted.


 


IMPRESSION:


 


1.  Status post median sternotomy.


2.  Calcified thoracic aortic arch.


3.  Shallow inspiration with minimal bibasilar subsegmental atelectasis.


4.  Prior right shoulder surgery.


5.  Old posterior left rib fracture deformities.


6.  Carotid bifurcation calcifications.


_____________________________________________ 


.Ted Menendez MD, MD           Date    Time 


Electronically viewed and signed by .Ted Menendez MD, MD on 12/03/2017 01:32 


 


D:  12/03/2017 01:32  T:  12/03/2017 01:32


.M/





CC: SHRADDHA JORDAN MD





EKG: sinus tachycardia at 105 bpm normal axis, narrow QRS complex, no 

concerning ST elevations or depressions noted.


Above as per ED physician documentation





Labs


Result Diagram:  


12/2/17 2231 12/2/17 2231








Medications


Medications





 Current Medications


Ondansetron HCl (Zofran Inj) 4 mg Q6H  PRN IV NAUSEA AND/OR VOMITING;  Start 12/

3/17 at 04:00


Acetaminophen (Tylenol Tab) 650 mg Q6H  PRN PO PAIN LEVEL 1-3 OR FEVER;  Start 

12/3/17 at 04:00


Morphine Sulfate (morphine) 2 mg Q4H  PRN IV PAIN LEVEL 7-10;  Start 12/3/17 at 

04:00


Acetaminophen/ Hydrocodone Bitart (Norco (10/325)) 1 tab Q6H  PRN PO PAIN Last 

administered on 12/3/17at 06:10; Admin Dose 1 TAB;  Start 12/3/17 at 05:00











DARRIUS CLOES Dec 3, 2017 09:05

## 2017-12-03 NOTE — PN
Date/Time of Note


Date/Time of Note


DATE: 12/3/17 


TIME: 14:10





Assessment/Plan


VTE Prophylaxis


VTE Prophylaxis Intervention:  SCD's





Lines/Catheters


IV Catheter Type (from Nrs):  Saline Lock





Assessment/Plan


Assessment/Plan


58 yo M with CAD sp CABG 2 weeks ago at OSH here with SOB. CTA without evidence 

of PE but did show fluid retrosternal fluid collection, pleural effusions, and 

pericardial effusion without evidence of tamponade. No bianca evidence of pulm 

infiltrate


PLAN


CT surgery to see in AM. per brief discussion with CT surgeon today, it is not 

uncommon to see retrosternal and pericardial fluid after CABG


cont supportive care


check RVP to eval for viral URI. No evidence of pneumonia





CAD, HTN, HL: cont home BP, antiplatelet and lipid meds


DM2: SSI, hold SFU





Subjective


24 Hr Interval Summary


Free Text/Dictation


Pt sleeping when I went to see him this AM





Exam/Review of Systems


Vital Signs


Vitals





 Vital Signs








  Date Time  Temp Pulse Resp B/P Pulse Ox O2 Delivery O2 Flow Rate FiO2


 


12/3/17 12:28  101      


 


12/3/17 12:18 98.2  17 139/83 95   


 


12/3/17 03:36      Room Air  











Exam


nad


no mrg


abd soft


no rashes


no edema





TTE reviewed, +mild/mod pericardial effusion


dw cardiologist who read the study, no evidence of tamponade physiology





Results


Result Diagram:  


12/2/17 2231 12/2/17 2231





Results 24 hrs





Laboratory Tests








Test


  12/2/17


22:17 12/2/17


22:31 12/3/17


08:25 12/3/17


08:26


 


Blood Gas Specimen Source


  Blood arterial


  


  


  


 


 


Arterial Blood Date Drawn


  12/2/2017


10:30:33 PM 


  


  


 


 


Arterial Blood pH (Temp


corrected) 7.430  


  


  


  


 


 


Arterial Blood pCO2 (Temp


correct) 38.4  


  


  


  


 


 


Arterial Blood pO2 (Temp


corrected) 74.4  L


  


  


  


 


 


Arterial Blood HCO3 24.9     


 


Arterial Blood Base Excess 0.7     


 


Arterial Blood Oxygen


Saturation 94.5  L


  


  


  


 


 


Teo Test ACCEPTAB     


 


Arterial Blood Gas Puncture


Site Right Radial  


  


  


  


 


 


Arterial Blood


Carboxyhemoglobin 0.3  


  


  


  


 


 


Arterial Blood Methemoglobin 0.1     


 


Blood Gas A-a O2 Differential 29.3  H   


 


Oxyhemoglobin Percent 94.1     


 


Total Hemoglobin 11.2  L   


 


Blood Gas Temperature 37.0     


 


Blood Gas Actual Respiration


Rate 18  


  


  


  


 


 


Blood Gas Modality ROOM AIR     


 


FiO2 21.0     


 


Blood Gas Notified Whom      


 


Blood Gas Notified Time


  12/2/2017


10:37:19 PM 


  


  


 


 


White Blood Count  12.3  #H  


 


Red Blood Count  3.43  L  


 


Hemoglobin  10.5  L  


 


Hematocrit  32.2  L  


 


Mean Corpuscular Volume  93.9    


 


Mean Corpuscular Hemoglobin  30.6    


 


Mean Corpuscular Hemoglobin


Concent 


  32.6  


  


  


 


 


Red Cell Distribution Width  13.0    


 


Platelet Count  553  H  


 


Mean Platelet Volume  9.1    


 


Neutrophils %  69.4    


 


Lymphocytes %  18.6    


 


Monocytes %  7.7    


 


Eosinophils %  2.9    


 


Basophils %  0.6    


 


Nucleated Red Blood Cells %  0.0    


 


Neutrophils #  8.5  H  


 


Lymphocytes #  2.3    


 


Monocytes #  1.0  H  


 


Eosinophils #  0.4    


 


Basophils #  0.1    


 


Nucleated Red Blood Cells #  0.0    


 


Prothrombin Time  14.6    


 


Prothrombin Time Ratio  1.1    


 


INR International Normalized


Ratio 


  1.12  


  


  


 


 


Sodium Level  141    


 


Potassium Level  3.6    


 


Chloride Level  102    


 


Carbon Dioxide Level  28    


 


Anion Gap  15    


 


Blood Urea Nitrogen  15    


 


Creatinine  0.85    


 


Glucose Level  109    


 


Calcium Level  9.1    


 


Total Bilirubin  0.3    


 


Direct Bilirubin  0.00    


 


Indirect Bilirubin  0.3    


 


Aspartate Amino Transf


(AST/SGOT) 


  25  


  


  


 


 


Alanine Aminotransferase


(ALT/SGPT) 


  38  


  


  


 


 


Alkaline Phosphatase  105    


 


Troponin I  0.062    0.053  


 


B-Type Natriuretic Peptide  1170  H  


 


Total Protein  7.1    


 


Albumin  3.9    


 


Globulin  3.20    


 


Albumin/Globulin Ratio  1.21    


 


Lipase  51    


 


Magnesium Level   1.1  L 


 


Triglycerides Level   125   


 


Cholesterol Level   87  L 


 


LDL Cholesterol, Calculated   30   


 


HDL Cholesterol   32   


 


Cholesterol/HDL Ratio   2.7   


 


Thyroid Stimulating Hormone


(TSH) 


  


  3.180  


  


 


 


Hemoglobin A1c    7.4  H


 


Creatine Kinase    76  


 


Creatine Kinase Index    1.6  


 


Creatinine Kinase MB (Mass)    1.18  











Medications


Medications





 Current Medications


Ondansetron HCl (Zofran Inj) 4 mg Q6H  PRN IV NAUSEA AND/OR VOMITING;  Start 12/

3/17 at 04:00


Acetaminophen (Tylenol Tab) 650 mg Q6H  PRN PO PAIN LEVEL 1-3 OR FEVER;  Start 

12/3/17 at 04:00


Morphine Sulfate (morphine) 2 mg Q4H  PRN IV PAIN LEVEL 7-10;  Start 12/3/17 at 

04:00


Acetaminophen/ Hydrocodone Bitart (Norco (10/325)) 1 tab Q6H  PRN PO PAIN Last 

administered on 12/3/17at 06:10; Admin Dose 1 TAB;  Start 12/3/17 at 05:00


Promethazine HCl/ Codeine (Phenergan/ Codeine) 10 ml Q4H  PRN PO COUGH Last 

administered on 12/3/17at 10:36; Admin Dose 10 ML;  Start 12/3/17 at 10:00


Phenol 1 lozenge 1 lozenge Q1H  PRN MT COUGH;  Start 12/3/17 at 10:00


Magnesium Sulfate (Magnesium Sulfate 4 Gm/100 ml) 100 ml @  25 mls/hr ONCE  

ONCE IVPB ;  Start 12/3/17 at 14:00;  Stop 12/3/17 at 17:59


Docusate Sodium (Colace) 100 mg DAILY  PRN PO CONSTIPATION;  Start 12/3/17 at 14

:00











JONAS GOMEZ MD Dec 3, 2017 14:11

## 2017-12-03 NOTE — RADRPT
PROCEDURE:   CHEST - 1 VIEW  

 

CLINICAL INDICATION:   57-year-old male with chest/abdominal pain. 

 

TECHNIQUE:   A single frontal AP semi-erect view of the chest was performed.  The images were review
ed on a PACS workstation. 

 

COMPARISON:   None.  

 

FINDINGS:

 

The patient has had a prior median sternotomy. The cardiomediastinal silhouette is within normal nicolas
its. The thoracic aortic arch is mildly calcified. There is a shallow inspiration. There is minimal 
bibasilar subsegmental atelectasis. There is no evidence for an infiltrate.  There is no evidence fo
r congestive heart failure. There is no evidence for pneumothorax. There are small metallic densitie
s within the inferior right glenoid from prior shoulder surgery. There are old fracture deformities 
involving the posterior left fourth, fifth, sixth and seventh ribs. Bilateral carotid bifurcation re
gion calcifications are noted.

 

IMPRESSION:

 

1.  Status post median sternotomy.

2.  Calcified thoracic aortic arch.

3.  Shallow inspiration with minimal bibasilar subsegmental atelectasis.

4.  Prior right shoulder surgery.

5.  Old posterior left rib fracture deformities.

6.  Carotid bifurcation calcifications.

_____________________________________________ 

.Ted Menendez MD, MD           Date    Time 

Electronically viewed and signed by .Ted Menendez MD, MD on 12/03/2017 01:32 

 

D:  12/03/2017 01:32  T:  12/03/2017 01:32

.M/

## 2017-12-03 NOTE — RADRPT
PROCEDURE:    CT angiogram of the chest with contrast. 

 

CLINICAL INDICATION:    Shortness of breath. 

 

TECHNIQUE:    CT angiogram of the chest was obtained using a multi-detector high-resolution CT.  Con
tiguous axial images were obtained during the dynamic injection of 80 cc of Omnipaque 350 intravenou
s contrast.  Coronal and sagittal reformatted images were obtained.  3-D reformatted images were als
o obtained.  Images were reviewed on a PACS workstation. DICOM images are available.

 

One or more of the following dose reduction techniques were used:

- Automated exposure control.

- Adjustment of the mA and/or kV according to patient size.

- Use of iterative reconstruction technique.

 

Exam CTD/vol = 17.90 mGy.

Total exam DLP = 720.80 mGy-cm.   

 

COMPARISON:    None. 

 

FINDINGS:

The main pulmonary artery followed to the segmental divisions are well opacified.  There is no filli
ng defect or evidence of pulmonary embolism.  The heart is normal in size with coronary artery calci
fications.  There is a small to moderate pericardial effusion.  The aorta is of normal course and ca
liber without evidence of aneurysm or dissection.

 

The patient is status post mediastinotomy.  There is a retrosternal fluid collection measuring 3.9 c
m AP by 7.5 cm transverse by 16.1 cm sagittal. The visualized thyroid is unremarkable.  There are no
 enlarged axillary lymph nodes.  There are no enlarged mediastinal or hilar lymph nodes by CT criter
ia.  An azygos lobe is present. There are bilateral small pleural effusion with underlying atelectas
is, greater on the left.  There is bilateral mild peribronchial thickening.

 

Limited evaluation of the upper abdomen demonstrates multiple gallstones. 

 

IMPRESSION:

No evidence of pulmonary embolism or aortic dissection. 

 

Status post mediastinotomy and CABG with a moderate-to-large retrosternal fluid collection measuring
 3.9 x 7.5 x 16.1 cm. Findings could represent a postoperative seroma, hematoma or abscess.

 

Small to moderate pericardial effusion.

 

Bilateral small pleural effusions with underlying atelectasis, greater on the left.

 

Cholelithiasis.

 

Coronary artery disease.

_____________________________________________ 

.Chidi Girard MD, MD           Date    Time 

Electronically viewed and signed by .Chidi Girard MD, MD on 12/03/2017 03:09 

 

D:  12/03/2017 03:09  T:  12/03/2017 03:09

.T/

## 2017-12-04 VITALS — DIASTOLIC BLOOD PRESSURE: 71 MMHG | RESPIRATION RATE: 18 BRPM | SYSTOLIC BLOOD PRESSURE: 95 MMHG

## 2017-12-04 VITALS — DIASTOLIC BLOOD PRESSURE: 78 MMHG | RESPIRATION RATE: 19 BRPM | SYSTOLIC BLOOD PRESSURE: 117 MMHG

## 2017-12-04 VITALS — HEART RATE: 106 BPM

## 2017-12-04 VITALS — DIASTOLIC BLOOD PRESSURE: 69 MMHG | RESPIRATION RATE: 20 BRPM | SYSTOLIC BLOOD PRESSURE: 120 MMHG

## 2017-12-04 VITALS — RESPIRATION RATE: 18 BRPM | DIASTOLIC BLOOD PRESSURE: 68 MMHG | SYSTOLIC BLOOD PRESSURE: 108 MMHG

## 2017-12-04 VITALS — HEART RATE: 94 BPM

## 2017-12-04 VITALS — SYSTOLIC BLOOD PRESSURE: 100 MMHG | DIASTOLIC BLOOD PRESSURE: 63 MMHG | RESPIRATION RATE: 19 BRPM

## 2017-12-04 VITALS — HEART RATE: 93 BPM

## 2017-12-04 VITALS — HEART RATE: 98 BPM

## 2017-12-04 VITALS — HEART RATE: 91 BPM

## 2017-12-04 LAB
ALBUMIN SERPL-MCNC: 3.6 G/DL (ref 3.3–4.9)
ALBUMIN/GLOB SERPL: 1.16 {RATIO}
ALP SERPL-CCNC: 113 IU/L (ref 42–121)
ALT SERPL-CCNC: 36 IU/L (ref 13–69)
ANION GAP SERPL CALC-SCNC: 14 MMOL/L (ref 8–16)
AST SERPL-CCNC: 25 IU/L (ref 15–46)
BASOPHILS # BLD AUTO: 0 10^3/UL (ref 0–0.1)
BASOPHILS NFR BLD: 0.4 % (ref 0–2)
BILIRUB DIRECT SERPL-MCNC: 0 MG/DL (ref 0–0.2)
BILIRUB SERPL-MCNC: 0.1 MG/DL (ref 0.2–1.3)
BUN SERPL-MCNC: 16 MG/DL (ref 7–20)
CALCIUM SERPL-MCNC: 9.1 MG/DL (ref 8.4–10.2)
CHLORIDE SERPL-SCNC: 100 MMOL/L (ref 97–110)
CO2 SERPL-SCNC: 29 MMOL/L (ref 21–31)
CREAT SERPL-MCNC: 0.82 MG/DL (ref 0.61–1.24)
EOSINOPHIL # BLD: 0.2 10^3/UL (ref 0–0.5)
EOSINOPHIL NFR BLD: 2.5 % (ref 0–7)
ERYTHROCYTE [DISTWIDTH] IN BLOOD BY AUTOMATED COUNT: 12.9 % (ref 11.5–14.5)
GLOBULIN SER-MCNC: 3.1 G/DL (ref 1.3–3.2)
GLUCOSE SERPL-MCNC: 208 MG/DL (ref 70–220)
HCT VFR BLD CALC: 31.5 % (ref 42–52)
HGB BLD-MCNC: 10.3 G/DL (ref 14–18)
LYMPHOCYTES # BLD AUTO: 1.4 10^3/UL (ref 0.8–2.9)
LYMPHOCYTES NFR BLD AUTO: 15.2 % (ref 15–51)
MCH RBC QN AUTO: 30.7 PG (ref 29–33)
MCHC RBC AUTO-ENTMCNC: 32.7 G/DL (ref 32–37)
MCV RBC AUTO: 94 FL (ref 82–101)
MONOCYTES # BLD: 0.9 10^3/UL (ref 0.3–0.9)
MONOCYTES NFR BLD: 9.5 % (ref 0–11)
NEUTROPHILS # BLD: 6.6 10^3/UL (ref 1.6–7.5)
NEUTROPHILS NFR BLD AUTO: 72.1 % (ref 39–77)
NRBC # BLD MANUAL: 0 10^3/UL (ref 0–0)
NRBC BLD AUTO-RTO: 0 /100WBC (ref 0–0)
PLATELET # BLD: 495 10^3/UL (ref 140–415)
PMV BLD AUTO: 9.1 FL (ref 7.4–10.4)
POTASSIUM SERPL-SCNC: 4.1 MMOL/L (ref 3.5–5.1)
PROT SERPL-MCNC: 6.7 G/DL (ref 6.1–8.1)
RBC # BLD AUTO: 3.35 10^6/UL (ref 4.7–6.1)
SODIUM SERPL-SCNC: 139 MMOL/L (ref 135–144)
WBC # BLD AUTO: 9.1 10^3/UL (ref 4.8–10.8)

## 2017-12-04 RX ADMIN — ATORVASTATIN CALCIUM SCH MG: 80 TABLET, FILM COATED ORAL at 20:39

## 2017-12-04 RX ADMIN — LISINOPRIL SCH MG: 5 TABLET ORAL at 08:43

## 2017-12-04 RX ADMIN — IPRATROPIUM BROMIDE AND ALBUTEROL SULFATE SCH ML: .5; 3 SOLUTION RESPIRATORY (INHALATION) at 19:22

## 2017-12-04 RX ADMIN — IPRATROPIUM BROMIDE AND ALBUTEROL SULFATE SCH ML: .5; 3 SOLUTION RESPIRATORY (INHALATION) at 08:30

## 2017-12-04 RX ADMIN — IPRATROPIUM BROMIDE AND ALBUTEROL SULFATE SCH ML: .5; 3 SOLUTION RESPIRATORY (INHALATION) at 01:28

## 2017-12-04 RX ADMIN — IPRATROPIUM BROMIDE AND ALBUTEROL SULFATE SCH ML: .5; 3 SOLUTION RESPIRATORY (INHALATION) at 14:10

## 2017-12-04 RX ADMIN — ASPIRIN SCH MG: 81 TABLET, COATED ORAL at 08:42

## 2017-12-04 RX ADMIN — CLOPIDOGREL SCH MG: 75 TABLET, FILM COATED ORAL at 08:42

## 2017-12-04 NOTE — PN
Date/Time of Note


Date/Time of Note


DATE: 12/4/17 


TIME: 16:39





Assessment/Plan


VTE Prophylaxis


VTE Prophylaxis Intervention:  SCD's





Lines/Catheters


IV Catheter Type (from [facility]):  Saline Lock


Urinary Cath still in place:  No





Assessment/Plan


Chief Complaint/Hosp Course


S: Patient presently denies chest pain.  Awaiting to be seen by cardiothoracic 

surgeon.





O: VS (see below)





PE:


General: Sitting in chair, wife at bedside, no acute distress.


HEENT: Atraumatic, normocephalic. The pupils are equal, round and reactive. 

Extraocular motor are intact


Neck: Supple with full range of motion. No rigidity or meningismus


Chest: Tender to palpation across the anterior chest wall


Lungs: Clear to auscultation bilaterally, no crackles no rales or wheezing, 

cough with clear sputum


Heart: Normal S1-S2, Regular rhythm and rate.  No overt murmur appreciated


Abdomen: Soft , nontender,  nondistended , bowel sounds are present. No 

guarding no rebound tenderness , No masses or organomegaly. No costovertebral 

temporal angle mass


Extremities: Normal to inspection, no edema no cyanosis


Neurologic: Normal mental status, speech normal, cranial nerves II through XII 

are intact, motor and sensory are intact, no focal weakness








A/P: 56 yo M with CAD sp CABG 2 weeks ago at OSH here with SOB. CTA without 

evidence of PE but did show fluid retrosternal fluid collection, pleural 

effusions, and pericardial effusion without evidence of tamponade. No bianca 

evidence of pulm infiltrate





#1 shortness of breath: This likely could be multifactorial secondary to 

underlying pericardial effusion, mild CHF exacerbation, and recent coronary 

artery bypass grafting 17-18 days ago at outside hospital. CTA shows: Status 

post mediastinotomy and CABG with a moderate-to-large retrosternal fluid 

collection measuring 3.9 x 7.5 x 16.1 cm. Findings could represent a 

postoperative seroma, hematoma or abscess.  Current time patient is afebrile.  

He does have a mildly elevated white blood cell count. 


   -Follow-up CTS recommendations, per brief discussion with CT surgeon 

yesterday, it is not uncommon to see retrosternal and pericardial fluid after 

CABG


   - cont supportive care





2.  Elevated BNP: Follow-up echo





3. hypertension: Continue to monitor and continue patient's home medications 

once they are verified.





4. diabetes mellitus, check hemoglobin A1c, insulin sliding scale





5. coronary artery disease: Continue patient's home medications, patient 

recently had an an MI status post CABG.





#7 DVT GI prophylaxis: SCDs





Further treatment strategy will be implemented as per the clinical course


Problems:  





Exam/Review of Systems


Vital Signs


Vitals





 Vital Signs








  Date Time  Temp Pulse Resp B/P Pulse Ox O2 Delivery O2 Flow Rate FiO2


 


12/4/17 15:52 98.3 99 19 100/63 94   


 


12/4/17 14:10      Nasal Cannula 2.0 28














 Intake and Output   


 


 12/3/17 12/3/17 12/4/17





 15:00 23:00 07:00


 


Intake Total  500 ml 350 ml


 


Balance  500 ml 350 ml











Results


Result Diagram:  


12/4/17 1417                                                                   

             12/4/17 1418





Results 24 hrs





Laboratory Tests








Test


  12/3/17


17:20 12/3/17


21:26 12/4/17


08:40 12/4/17


12:01


 


Bedside Glucose 174   119   114   261  H














Test


  12/4/17


14:17 12/4/17


14:18 


  


 


 


White Blood Count 9.1  #   


 


Red Blood Count 3.35  L   


 


Hemoglobin 10.3  L   


 


Hematocrit 31.5  L   


 


Mean Corpuscular Volume 94.0     


 


Mean Corpuscular Hemoglobin 30.7     


 


Mean Corpuscular Hemoglobin


Concent 32.7  


  


  


  


 


 


Red Cell Distribution Width 12.9     


 


Platelet Count 495  H   


 


Mean Platelet Volume 9.1     


 


Neutrophils % 72.1     


 


Lymphocytes % 15.2     


 


Monocytes % 9.5     


 


Eosinophils % 2.5     


 


Basophils % 0.4     


 


Nucleated Red Blood Cells % 0.0     


 


Neutrophils # 6.6     


 


Lymphocytes # 1.4     


 


Monocytes # 0.9     


 


Eosinophils # 0.2     


 


Basophils # 0.0     


 


Nucleated Red Blood Cells # 0.0     


 


Sodium Level  139    


 


Potassium Level  4.1    


 


Chloride Level  100    


 


Carbon Dioxide Level  29    


 


Anion Gap  14    


 


Blood Urea Nitrogen  16    


 


Creatinine  0.82    


 


Glucose Level  208    


 


Calcium Level  9.1    


 


Total Bilirubin  0.1  L  


 


Direct Bilirubin  0.00    


 


Indirect Bilirubin  0.1    


 


Aspartate Amino Transf


(AST/SGOT) 


  25  


  


  


 


 


Alanine Aminotransferase


(ALT/SGPT) 


  36  


  


  


 


 


Alkaline Phosphatase  113    


 


Total Protein  6.7    


 


Albumin  3.6    


 


Globulin  3.10    


 


Albumin/Globulin Ratio  1.16    











Medications


Medications





 Current Medications


Ondansetron HCl (Zofran Inj) 4 mg Q6H  PRN IV NAUSEA AND/OR VOMITING;  Start 12/

3/17 at 04:00


Acetaminophen (Tylenol Tab) 650 mg Q6H  PRN PO PAIN LEVEL 1-3 OR FEVER;  Start 

12/3/17 at 04:00


Morphine Sulfate (morphine) 2 mg Q4H  PRN IV PAIN LEVEL 7-10;  Start 12/3/17 at 

04:00


Acetaminophen/ Hydrocodone Bitart (Norco (10/325)) 1 tab Q6H  PRN PO PAIN Last 

administered on 12/4/17at 16:16; Admin Dose 1 TAB;  Start 12/3/17 at 05:00


Promethazine HCl/ Codeine (Phenergan/ Codeine) 10 ml Q4H  PRN PO COUGH Last 

administered on 12/3/17at 10:36; Admin Dose 10 ML;  Start 12/3/17 at 10:00


Phenol (Cepastat Lozenge) 1 lozenge Q1H  PRN MT COUGH Last administered on 12/3/

17at 15:59; Admin Dose 1 LOZENGE;  Start 12/3/17 at 10:00


Docusate Sodium (Colace) 100 mg DAILY  PRN PO CONSTIPATION;  Start 12/3/17 at 14

:00


Aspirin (Halfprin) 81 mg DAILY PO  Last administered on 12/4/17at 08:42; Admin 

Dose 81 MG;  Start 12/4/17 at 09:00


Atorvastatin Calcium (Lipitor) 80 mg QHS PO  Last administered on 12/3/17at 21:

28; Admin Dose 80 MG;  Start 12/3/17 at 21:00


Carvedilol (Coreg) 6.25 mg BID PO  Last administered on 12/4/17at 08:44; Admin 

Dose 6.25 MG;  Start 12/3/17 at 21:00


Clopidogrel Bisulfate (plaVIX) 75 mg DAILY PO  Last administered on 12/4/17at 08

:42; Admin Dose 75 MG;  Start 12/4/17 at 09:00


Lisinopril (Zestril) 2.5 mg DAILY PO  Last administered on 12/4/17at 08:43; 

Admin Dose 2.5 MG;  Start 12/4/17 at 09:00


Diagnostic Test (Pha) (Accu-Chek) 1 ea 02 XX ;  Start 12/4/17 at 02:00


Miscellaneous Information 1 ea NOTE XX ;  Start 12/3/17 at 16:00


Glucose (Glutose) 15 gm Q15M  PRN PO DECREASED GLUCOSE;  Start 12/3/17 at 16:00


Glucose (Glutose) 22.5 gm Q15M  PRN PO DECREASED GLUCOSE;  Start 12/3/17 at 16:

00


Dextrose (D50w Syringe) 25 ml Q15M  PRN IV DECREASED GLUCOSE;  Start 12/3/17 at 

16:00


Dextrose (D50w Syringe) 50 ml Q15M  PRN IV DECREASED GLUCOSE;  Start 12/3/17 at 

16:00


Glucagon (Glucagen) 1 mg Q15M  PRN IM DECREASED GLUCOSE;  Start 12/3/17 at 16:00


Glucose (Glutose) 15 gm Q15M  PRN BUCCAL DECREASED GLUCOSE;  Start 12/3/17 at 16

:00











JADE GRECO Dec 4, 2017 16:44

## 2017-12-05 VITALS — HEART RATE: 80 BPM

## 2017-12-05 VITALS — SYSTOLIC BLOOD PRESSURE: 107 MMHG | RESPIRATION RATE: 19 BRPM | DIASTOLIC BLOOD PRESSURE: 60 MMHG

## 2017-12-05 VITALS — HEART RATE: 103 BPM

## 2017-12-05 VITALS — DIASTOLIC BLOOD PRESSURE: 65 MMHG | RESPIRATION RATE: 19 BRPM | SYSTOLIC BLOOD PRESSURE: 111 MMHG

## 2017-12-05 VITALS — SYSTOLIC BLOOD PRESSURE: 110 MMHG | DIASTOLIC BLOOD PRESSURE: 74 MMHG | RESPIRATION RATE: 18 BRPM

## 2017-12-05 VITALS — HEART RATE: 92 BPM

## 2017-12-05 VITALS — DIASTOLIC BLOOD PRESSURE: 82 MMHG | RESPIRATION RATE: 17 BRPM | SYSTOLIC BLOOD PRESSURE: 138 MMHG

## 2017-12-05 VITALS — SYSTOLIC BLOOD PRESSURE: 107 MMHG | DIASTOLIC BLOOD PRESSURE: 72 MMHG | RESPIRATION RATE: 18 BRPM

## 2017-12-05 VITALS — HEART RATE: 95 BPM

## 2017-12-05 VITALS — HEART RATE: 88 BPM

## 2017-12-05 RX ADMIN — IPRATROPIUM BROMIDE AND ALBUTEROL SULFATE SCH ML: .5; 3 SOLUTION RESPIRATORY (INHALATION) at 14:00

## 2017-12-05 RX ADMIN — LISINOPRIL SCH MG: 5 TABLET ORAL at 08:18

## 2017-12-05 RX ADMIN — IPRATROPIUM BROMIDE AND ALBUTEROL SULFATE SCH ML: .5; 3 SOLUTION RESPIRATORY (INHALATION) at 01:50

## 2017-12-05 RX ADMIN — ASPIRIN SCH MG: 81 TABLET, COATED ORAL at 08:18

## 2017-12-05 RX ADMIN — CLOPIDOGREL SCH MG: 75 TABLET, FILM COATED ORAL at 08:18

## 2017-12-05 RX ADMIN — IPRATROPIUM BROMIDE AND ALBUTEROL SULFATE SCH ML: .5; 3 SOLUTION RESPIRATORY (INHALATION) at 07:55

## 2017-12-05 NOTE — CONS
DATE OF ADMISSION: 12/02/2017

DATE OF CONSULTATION:  

 

 

 

REASON FOR CONSULTATION:  Pericardial effusion.  

 

HISTORY OF PRESENT ILLNESS:  This is a 57-year-old male who underwent coronary artery bypass graftin
g about 2 weeks ago.  The patient subsequently was found to have shortness of breath, admitted.  Par
t of his workup has included a CT angiogram of the chest which has showed no evidence of any pulmona
ry embolism or dissection, but the patient does have a moderate amount of large retrosternal fluid c
ollection.  Echocardiogram was done which showed small to moderate pericardial effusion mostly aroun
d the LV, bilateral pleural effusions, no signs of tamponade.

 

PAST MEDICAL HISTORY:  Hypertension, hyperlipidemia, diabetes, coronary artery disease.

 

PAST SURGICAL HISTORY:  Coronary artery bypass grafting.

 

MEDICATIONS:  List reviewed.

 

PHYSICAL EXAMINATION:

VITAL SIGNS:  Blood pressure is 107/72, pulse is 88, respirations 18.

CARDIOVASCULAR:  Normal S1, S2. 

LUNGS:  Clear.

ABDOMEN:  Soft.

EXTREMITIES:  Warm.

 

IMPRESSION:  Status post coronary artery bypass grafting with small to moderate amount of pericardia
l effusion.  No signs of tamponade.  No plan for surgery at this time.  Would monitor.  The patient 
may be discharged home.  Follow up with original surgeon.  Discussed with the patient.

 

 

Dictated By: ROBERTA PERALES MD

 

FM/NTS

DD:    12/05/2017 15:29:12

DT:    12/05/2017 18:51:16

Conf#: [id number]

DID#:  [id number]

CC: DARRIUS COLES MD;*EndCC*

## 2017-12-05 NOTE — DS
Date/Time of Note


Date/Time of Note


DATE: 12/5/17 


TIME: 14:39





Discharge Summary


Admission/Discharge Info


Admit Date/Time


Dec 2, 2017 at 23:56


Discharge Date/Time





Discharge Diagnosis


1. shortness of breath: This likely could be multifactorial secondary to 

underlying pericardial effusion, mild CHF exacerbation, and recent coronary 

artery bypass grafting 18 days ago at outside hospital. CTA shows: Status post 

mediastinotomy and CABG with a moderate-to-large retrosternal fluid collection 

measuring 3.9 x 7.5 x 16.1 cm





2.  Elevated BNP: sec to # 1





3. hypertension





4. diabetes mellitus





5. coronary artery disease- recent MI status post CABG.





6.  Chest pain: Ruled out for acute coronary syndrome this admission


Patient Condition:  Stable


Hospital Course


57-year-old man status post CABG about 15 days prior to admission at outside 

hospital, presented with shortness of breath  He lives in his car and does not 

ambulate much, he denies fevers  but did report some chills chills, no history 

of COPD or smoking, no complaints of chest pain.  He has had no vomiting or 

diarrhea, no complaints of abdominal pain.  Patient denies calf or leg 

swelling.  Patient was seen and evaluated here yesterday and workup was 

unremarkable, bilateral lower extremity ultrasound was negative for DVT.  He 

does report that he has been coughing for approximately 5 days.  He states that 

his wife has been also having similar symptoms.  He also reports sputum which 

is clear.  Denies any fevers.  Though he does state he did notice some chills.  

He was admitted and had CTA that showed: Status post mediastinotomy and CABG 

with a moderate-to-large retrosternal fluid collection measuring 3.9 x 7.5 x 

16.1 cm. patient was admitted ruled out for acute coronary syndrome.  His chest 

pain symptoms slowly improved, although he still had some pain likely secondary 

to the surgery he had sternotomy.  He was able to ambulate, tolerated p.o. 

diet.  He was awaiting cardiothoracic surgery evaluation today, if he is 

cleared by then he will be discharged home today improved condition.  He has a 

follow-up appointment with his cardiologist at [facility] in the next 

few days, he is encouraged to keep that appointment as well as follow-up with 

his primary care doctor.  See below for full list of discharge medications.


Home Meds


Active Scripts


Benzocaine/Menthol (SORE THROAT LOZENGE) 1 Each Lozenge, 1 LOZENGE MT Q1H Y for 

COUGH, #90 LOZENGE


   Prov:JADE GRECO.         12/5/17


Lisinopril* (Lisinopril*) 2.5 Mg Tablet, 2.5 MG PO DAILY, #30 TAB 4 Refills


   Prov:JADE GRECO S.         12/5/17


Glipizide* (Glipizide*) 5 Mg Tablet, 5 MG PO AC BREAKFAST DINNER, #60 TAB 4 

Refills


   Prov:JADE GRECO S.         12/5/17


Clopidogrel Bisulfate (Clopidogrel) 75 Mg Tablet, 75 MG PO DAILY, #30 TAB 8 

Refills


   Prov:JADE GRECO.         12/5/17


Carvedilol* (Carvedilol*) 6.25 Mg Tablet, 6.25 MG PO BID, #60 TAB 4 Refills


   Prov:JADE GRECO.         12/5/17


Atorvastatin* (Atorvastatin*) 80 Mg Tablet, 80 MG PO QHS, #30 TAB 4 Refills


   Prov:JADE GRECO.         12/5/17


Aspirin* (Aspirin* EC) 81 Mg Tablet.dr, 81 MG PO DAILY, #30 TAB 4 Refills


   Prov:JADE GRECO.         12/5/17


Reported Medications


Hydrocodone/Acetaminophen (Norco 5-325 Tablet) 1 Each Tablet, 1 EACH PO Q4, TAB


   12/3/17


Follow-up Plan


Please take your medications as prescribed.  Please follow-up with your 

cardiologist and regular doctor in the clinic in the next few days.


Primary Care Provider


Care Physician No Primary


Time spent on discharge:   > 30 minutes


Pending Labs





Laboratory Tests








Test


  12/4/17


17:23 12/4/17


20:40 12/5/17


08:05 12/5/17


12:14


 


Bedside Glucose


  190mg/dL


() 193mg/dL


() 149mg/dL


() 213mg/dL


()

















JADE GRECO Dec 5, 2017 14:44

## 2017-12-05 NOTE — PN
Date/Time of Note


Date/Time of Note


DATE: 12/5/17 


TIME: 11:53





Assessment/Plan


VTE Prophylaxis


VTE Prophylaxis Intervention:  SCD's





Lines/Catheters


[facility] C[facility]ter Type (from [facility]):  Saline Lock


Urinary Cath still in place:  No





Assessment/Plan


Chief Complaint/Hosp Course


S: Patient had some mild chest pain, otherwise no acute events overnight.  

Still waiting to be seen by cardiothoracic surgeon.





O: VS (see below)





PE:


General: Sitting in chair, wife at bedside, no acute distress.


HEENT: Atraumatic, normocephalic. The pupils are equal, round and reactive. 

Extraocular motor are intact


Neck: Supple with full range of motion. No rigidity or meningismus


Chest: Tender to palpation across the anterior chest wall


Lungs: Clear to auscultation bilaterally, no crackles no rales or wheezing, 

cough with clear sputum


Heart: Normal S1-S2, Regular rhythm and rate.  No overt murmur appreciated


Abdomen: Soft , nontender,  nondistended , bowel sounds are present. No 

guarding no rebound tenderness , No masses or organomegaly. No costovertebral 

temporal angle mass


Extremities: Normal to inspection, no edema no cyanosis


Neurologic: Normal mental status, speech normal, cranial nerves II through XII 

are intact, motor and sensory are intact, no focal weakness








A/P: 56 yo M with CAD sp CABG 2 weeks ago at OSH here with SOB. CTA without 

evidence of PE but did show fluid retrosternal fluid collection, pleural 

effusions, and pericardial effusion without evidence of tamponade. No bianca 

evidence of pulm infiltrate





#1 shortness of breath: This likely could be multifactorial secondary to 

underlying pericardial effusion, mild CHF exacerbation, and recent coronary 

artery bypass grafting 18 days ago at outside hospital. CTA shows: Status post 

mediastinotomy and CABG with a moderate-to-large retrosternal fluid collection 

measuring 3.9 x 7.5 x 16.1 cm. Findings could represent a postoperative seroma, 

hematoma or abscess.  Current time patient is afebrile.  He did present with a 

mildly elevated white blood cell count, normalizing now.


   -Follow-up CTS recommendations


   - cont supportive care





2.  Elevated BNP: Follow-up echo





3. hypertension: Continue to monitor and continue patient's home medications 

once they are verified.





4. diabetes mellitus - f/u hemoglobin A1c, insulin sliding scale





5. coronary artery disease: Continue patient's home medications, patient 

recently had an an MI status post CABG.





#7 DVT GI prophylaxis: SCDs





Further treatment strategy will be implemented as per the clinical course


Problems:  





Exam/Review of Systems


Vital Signs


Vitals





 Vital Signs








  Date Time  Temp Pulse Resp B/P Pulse Ox O2 Delivery O2 Flow Rate FiO2


 


12/5/17 11:39 97.3 88 18 107/72 95   


 


12/4/17 19:23      Nasal Cannula 2.0 


 


12/4/17 14:10        28











Results


Result Diagram:  


12/4/17 1417                                                                   

             12/4/17 1418





Results 24 hrs





Laboratory Tests








Test


  12/4/17


12:01 12/4/17


14:17 12/4/17


14:18 12/4/17


17:23


 


Bedside Glucose 261  H   190  


 


White Blood Count  9.1  #  


 


Red Blood Count  3.35  L  


 


Hemoglobin  10.3  L  


 


Hematocrit  31.5  L  


 


Mean Corpuscular Volume  94.0    


 


Mean Corpuscular Hemoglobin  30.7    


 


Mean Corpuscular Hemoglobin


Concent 


  32.7  


  


  


 


 


Red Cell Distribution Width  12.9    


 


Platelet Count  495  H  


 


Mean Platelet Volume  9.1    


 


Neutrophils %  72.1    


 


Lymphocytes %  15.2    


 


Monocytes %  9.5    


 


Eosinophils %  2.5    


 


Basophils %  0.4    


 


Nucleated Red Blood Cells %  0.0    


 


Neutrophils #  6.6    


 


Lymphocytes #  1.4    


 


Monocytes #  0.9    


 


Eosinophils #  0.2    


 


Basophils #  0.0    


 


Nucleated Red Blood Cells #  0.0    


 


Sodium Level   139   


 


Potassium Level   4.1   


 


Chloride Level   100   


 


Carbon Dioxide Level   29   


 


Anion Gap   14   


 


Blood Urea Nitrogen   16   


 


Creatinine   0.82   


 


Glucose Level   208   


 


Calcium Level   9.1   


 


Total Bilirubin   0.1  L 


 


Direct Bilirubin   0.00   


 


Indirect Bilirubin   0.1   


 


Aspartate Amino Transf


(AST/SGOT) 


  


  25  


  


 


 


Alanine Aminotransferase


(ALT/SGPT) 


  


  36  


  


 


 


Alkaline Phosphatase   113   


 


Total Protein   6.7   


 


Albumin   3.6   


 


Globulin   3.10   


 


Albumin/Globulin Ratio   1.16   














Test


  12/4/17


20:40 12/5/17


08:05 


  


 


 


Bedside Glucose 193   149    











Medications


Medications





 Current Medications


Ondansetron HCl (Zofran Inj) 4 mg Q6H  PRN IV NAUSEA AND/OR VOMITING;  Start 12/

3/17 at 04:00


Acetaminophen (Tylenol Tab) 650 mg Q6H  PRN PO PAIN LEVEL 1-3 OR FEVER;  Start 

12/3/17 at 04:00


Morphine Sulfate (morphine) 2 mg Q4H  PRN IV PAIN LEVEL 7-10 Last administered 

on 12/4/17at 20:46; Admin Dose 2 MG;  Start 12/3/17 at 04:00


Acetaminophen/ Hydrocodone Bitart (Norco (10/325)) 1 tab Q6H  PRN PO PAIN Last 

administered on 12/4/17at 23:52; Admin Dose 1 TAB;  Start 12/3/17 at 05:00


Promethazine HCl/ Codeine (Phenergan/ Codeine) 10 ml Q4H  PRN PO COUGH Last 

administered on 12/3/17at 10:36; Admin Dose 10 ML;  Start 12/3/17 at 10:00


Phenol (Cepastat Lozenge) 1 lozenge Q1H  PRN MT COUGH Last administered on 12/3/

17at 15:59; Admin Dose 1 LOZENGE;  Start 12/3/17 at 10:00


Docusate Sodium (Colace) 100 mg DAILY  PRN PO CONSTIPATION;  Start 12/3/17 at 14

:00


Aspirin (Halfprin) 81 mg DAILY PO  Last administered on 12/5/17at 08:18; Admin 

Dose 81 MG;  Start 12/4/17 at 09:00


Atorvastatin Calcium (Lipitor) 80 mg QHS PO  Last administered on 12/4/17at 20:

39; Admin Dose 80 MG;  Start 12/3/17 at 21:00


Carvedilol (Coreg) 6.25 mg BID PO  Last administered on 12/5/17at 08:18; Admin 

Dose 6.25 MG;  Start 12/3/17 at 21:00


Clopidogrel Bisulfate (plaVIX) 75 mg DAILY PO  Last administered on 12/5/17at 08

:18; Admin Dose 75 MG;  Start 12/4/17 at 09:00


Lisinopril (Zestril) 2.5 mg DAILY PO  Last administered on 12/5/17at 08:18; 

Admin Dose 2.5 MG;  Start 12/4/17 at 09:00


Diagnostic Test (Pha) (Accu-Chek) 1 ea 02 XX ;  Start 12/4/17 at 02:00


Miscellaneous Information 1 ea NOTE XX ;  Start 12/3/17 at 16:00


Glucose (Glutose) 15 gm Q15M  PRN PO DECREASED GLUCOSE;  Start 12/3/17 at 16:00


Glucose (Glutose) 22.5 gm Q15M  PRN PO DECREASED GLUCOSE;  Start 12/3/17 at 16:

00


Dextrose (D50w Syringe) 25 ml Q15M  PRN IV DECREASED GLUCOSE;  Start 12/3/17 at 

16:00


Dextrose (D50w Syringe) 50 ml Q15M  PRN IV DECREASED GLUCOSE;  Start 12/3/17 at 

16:00


Glucagon (Glucagen) 1 mg Q15M  PRN IM DECREASED GLUCOSE;  Start 12/3/17 at 16:00


Glucose (Glutose) 15 gm Q15M  PRN BUCCAL DECREASED GLUCOSE;  Start 12/3/17 at 16

:00











JADE GRECO Dec 5, 2017 11:55

## 2017-12-05 NOTE — PDOCDIS
Discharge Instructions


CONDITION


Patient Condition:  Stable





HOME CARE INSTRUCTIONS:


Diet Instructions:  Low Fat /Cholesterol





FOLLOW UP/APPOINTMENTS


Follow-up Plan


Please take your medications as prescribed.  Please follow-up with your 

cardiologist and regular doctor in the clinic in the next few days.











JADE GRECO Dec 5, 2017 14:34

## 2019-09-15 ENCOUNTER — HOSPITAL ENCOUNTER (EMERGENCY)
Dept: HOSPITAL 10 - E/R | Age: 59
Discharge: HOME | End: 2019-09-15
Payer: MEDICAID

## 2019-09-15 VITALS — BODY MASS INDEX: 38.39 KG/M2 | HEIGHT: 60 IN | WEIGHT: 195.55 LBS

## 2019-09-15 VITALS — DIASTOLIC BLOOD PRESSURE: 81 MMHG | HEART RATE: 99 BPM | RESPIRATION RATE: 17 BRPM | SYSTOLIC BLOOD PRESSURE: 141 MMHG

## 2019-09-15 DIAGNOSIS — Z79.82: ICD-10-CM

## 2019-09-15 DIAGNOSIS — E11.65: Primary | ICD-10-CM

## 2019-09-15 DIAGNOSIS — L03.115: ICD-10-CM

## 2019-09-15 DIAGNOSIS — Z95.1: ICD-10-CM

## 2019-09-15 DIAGNOSIS — Z79.84: ICD-10-CM

## 2019-09-15 DIAGNOSIS — I25.10: ICD-10-CM

## 2019-09-15 DIAGNOSIS — I25.2: ICD-10-CM

## 2019-09-15 DIAGNOSIS — I10: ICD-10-CM

## 2019-09-15 PROCEDURE — 36415 COLL VENOUS BLD VENIPUNCTURE: CPT

## 2019-09-15 PROCEDURE — 82962 GLUCOSE BLOOD TEST: CPT

## 2019-09-15 PROCEDURE — 96372 THER/PROPH/DIAG INJ SC/IM: CPT

## 2019-09-15 PROCEDURE — 85025 COMPLETE CBC W/AUTO DIFF WBC: CPT

## 2019-09-15 PROCEDURE — 84100 ASSAY OF PHOSPHORUS: CPT

## 2019-09-15 PROCEDURE — 81001 URINALYSIS AUTO W/SCOPE: CPT

## 2019-09-15 PROCEDURE — 80048 BASIC METABOLIC PNL TOTAL CA: CPT

## 2019-09-15 PROCEDURE — 82803 BLOOD GASES ANY COMBINATION: CPT

## 2019-09-15 PROCEDURE — 83735 ASSAY OF MAGNESIUM: CPT
